# Patient Record
Sex: MALE | Race: BLACK OR AFRICAN AMERICAN | NOT HISPANIC OR LATINO | Employment: FULL TIME | URBAN - METROPOLITAN AREA
[De-identification: names, ages, dates, MRNs, and addresses within clinical notes are randomized per-mention and may not be internally consistent; named-entity substitution may affect disease eponyms.]

---

## 2023-01-04 ENCOUNTER — OFFICE VISIT (OUTPATIENT)
Dept: FAMILY MEDICINE CLINIC | Facility: CLINIC | Age: 43
End: 2023-01-04
Payer: COMMERCIAL

## 2023-01-04 VITALS
OXYGEN SATURATION: 98 % | BODY MASS INDEX: 27.85 KG/M2 | RESPIRATION RATE: 18 BRPM | SYSTOLIC BLOOD PRESSURE: 158 MMHG | DIASTOLIC BLOOD PRESSURE: 94 MMHG | WEIGHT: 224 LBS | TEMPERATURE: 97.6 F | HEART RATE: 68 BPM | HEIGHT: 75 IN

## 2023-01-04 DIAGNOSIS — Z71.1 CONCERN ABOUT STD IN MALE WITHOUT DIAGNOSIS: ICD-10-CM

## 2023-01-04 DIAGNOSIS — Z13.1 DIABETES MELLITUS SCREENING: ICD-10-CM

## 2023-01-04 DIAGNOSIS — Z13.220 LIPID SCREENING: ICD-10-CM

## 2023-01-04 DIAGNOSIS — Z00.00 ANNUAL PHYSICAL EXAM: Primary | ICD-10-CM

## 2023-01-04 DIAGNOSIS — I10 PRIMARY HYPERTENSION: ICD-10-CM

## 2023-01-04 PROCEDURE — 99386 PREV VISIT NEW AGE 40-64: CPT

## 2023-01-04 PROCEDURE — 1159F MED LIST DOCD IN RCRD: CPT

## 2023-01-04 PROCEDURE — 99213 OFFICE O/P EST LOW 20 MIN: CPT

## 2023-01-04 PROCEDURE — 1160F RVW MEDS BY RX/DR IN RCRD: CPT

## 2023-01-04 RX ORDER — AMLODIPINE BESYLATE 5 MG/1
5 TABLET ORAL DAILY
Qty: 30 TABLET | Refills: 0 | Status: SHIPPED | OUTPATIENT
Start: 2023-01-04

## 2023-01-04 NOTE — PROGRESS NOTES
Subjective   Geraldo López is a 42 y.o. male. Presents today as a New patient here to establish care and c.o HTN  Chief Complaint   Patient presents with   • Establish Care   • Hypertension     Been off meds x1 yr no listing at [facility]      History of Present Illness  Previous PCP: none  Significant medical hx: htn  Significant family hx: HTN     Smoking 2 cigars/day , alcohol 1 drink/week, drug use- smokes marihuana daily.     HTN: has been off medication x1 yr. Does not check BP at home. No headache, vision changes, dizziness. Does not remember name of medication he was on.     STD concern- sexually active with same partner. Does not use protection. No current symptoms- states would like screening test    Diet: a lot of junk food. Stays active, no routine exercise    Review of Systems   Constitutional: Negative for fever and unexpected weight change.   Eyes: Negative for visual disturbance.   Respiratory: Negative.    Cardiovascular: Negative.    Genitourinary: Negative for difficulty urinating.   Neurological: Negative for dizziness and headaches.   Psychiatric/Behavioral: Negative for sleep disturbance.       There is no problem list on file for this patient.    Past Medical History:   Diagnosis Date   • Arthritis    • Hypertension      Past Surgical History:   Procedure Laterality Date   • HERNIA REPAIR       Family History   Problem Relation Age of Onset   • Hypertension Mother    • Hypertension Father    • Cardiomyopathy Brother      Social History     Socioeconomic History   • Marital status: Single   Tobacco Use   • Smoking status: Every Day     Types: Cigars   • Smokeless tobacco: Never   Vaping Use   • Vaping Use: Never used   Substance and Sexual Activity   • Alcohol use: Yes     Comment: RARE   • Drug use: Never   • Sexual activity: Yes     Partners: Female       No Known Allergies    No current outpatient medications on file prior to visit.     No current facility-administered medications on file  prior to visit.       Objective   Vitals:    01/04/23 1519   BP: 158/94   Pulse: 68   Resp: 18   Temp: 97.6 °F (36.4 °C)   TempSrc: Temporal   SpO2: 98%   Weight: 102 kg (224 lb)   Height: 190.5 cm (75\")   PainSc: 0-No pain     Body mass index is 28 kg/m².  Physical Exam  Constitutional:       Appearance: Normal appearance. He is not ill-appearing.   Cardiovascular:      Rate and Rhythm: Normal rate and regular rhythm.      Pulses: Normal pulses.      Heart sounds: Normal heart sounds, S1 normal and S2 normal. No murmur heard.  Pulmonary:      Effort: Pulmonary effort is normal. No respiratory distress.      Breath sounds: Normal breath sounds.   Musculoskeletal:      Right lower leg: No edema.      Left lower leg: No edema.   Neurological:      General: No focal deficit present.      Mental Status: He is alert and oriented to person, place, and time.      Cranial Nerves: No dysarthria.      Gait: Gait is intact.   Psychiatric:         Attention and Perception: Attention normal.         Mood and Affect: Mood normal.         Speech: Speech normal.         Behavior: Behavior normal.         Thought Content: Thought content normal.         Cognition and Memory: Cognition normal.         Judgment: Judgment normal.         Assessment & Plan   Diagnoses and all orders for this visit:    1. Annual physical exam (Primary)  -     CBC & Differential  -     Comprehensive Metabolic Panel  -     Hemoglobin A1c  -     Lipid Panel  -     TSH  -     Hepatitis C Antibody  -     Discussed safe sex practices Use condoms 100% of time with sex to protect against STIs- birth control does not provide protection. Eat a healthy diet and exercise routinely. Avoid smoking/alcohol and drugs. Use seatbelt 100% of time.       2. Primary hypertension  -     amLODIPine (NORVASC) 5 MG tablet; Take 1 tablet by mouth Daily.  Dispense: 30 tablet; Refill: 0  -     Elevated BP today and has prior dx. Will go ahead and start on amlodipine. Use and common  SE discussed.  Advised to get monitor and start checking at home- daily for now. Discussed risks of untreated HTN including but not limited to kidney disease, eye disease, MI, Stroke, etc.   -   Work on a healthy diet.  Limit salt, sugar and fatty foods.  Add exercise to your routine.  Recommendation is 150 minutes of moderate exercise per week if you cannot do that, at least walk 30 minutes 3-4 times per week.   Limit or avoid smoking and alcohol    3. Lipid screening  -     Lipid Panel    4. Diabetes mellitus screening  -     Hemoglobin A1c    5. Concern about STD in male without diagnosis  -     Hepatitis C Antibody  -     Chlamydia trachomatis, Neisseria gonorrhoeae, PCR - Urine, Urinary Bladder  -     RPR  -     HIV-1 / O / 2 Ag / Antibody 4th Generation         -Follow up: 2 weeks for BP check, sooner as needed. Will advise further once lab results available       - Pt agrees with plan of care and states no further concerns or questions today    This document is intended for medical expert use only. Reading of this document by patients and/or patient's family without participating medical staff guidance may result in misinterpretation and unintended morbidity.  Any interpretation of such data is the responsibility of the patient and/or family member responsible for the patient in concert with their primary or specialist providers, not to be left for sources of online searches such as [employer], [employer] or similar queries. Relying on these approaches to knowledge may result in misinterpretation, misguided goals of care and even death should patients or family members try recommendations outside of the realm of professional medical care in a supervised way.     Please allow 3-5 business days for recommendations based on new results     Go to the ER for any possible lifethreatening symptoms such as chest pain or shortness of air.      I personally spent 20 minutes with this patient, preparing for the visit, reviewing  tests, obtaining and/or reviewing a separately obtained history, performing a medically appropriate examination and/or evaluation , counseling and educating the patient/family/caregiver, ordering medications, tests, or procedures, documenting information in the medical record and independently interpreting results.

## 2023-01-06 DIAGNOSIS — R76.8 HEPATITIS C ANTIBODY TEST POSITIVE: Primary | ICD-10-CM

## 2023-01-06 LAB
ALBUMIN SERPL-MCNC: 4.9 G/DL (ref 4–5)
ALBUMIN/GLOB SERPL: 1.8 {RATIO} (ref 1.2–2.2)
ALP SERPL-CCNC: 101 IU/L (ref 44–121)
ALT SERPL-CCNC: 44 IU/L (ref 0–44)
AST SERPL-CCNC: 33 IU/L (ref 0–40)
BASOPHILS # BLD AUTO: 0 X10E3/UL (ref 0–0.2)
BASOPHILS NFR BLD AUTO: 0 %
BILIRUB SERPL-MCNC: 0.7 MG/DL (ref 0–1.2)
BUN SERPL-MCNC: 12 MG/DL (ref 6–24)
BUN/CREAT SERPL: 12 (ref 9–20)
C TRACH RRNA SPEC QL NAA+PROBE: NEGATIVE
CALCIUM SERPL-MCNC: 9.8 MG/DL (ref 8.7–10.2)
CHLORIDE SERPL-SCNC: 103 MMOL/L (ref 96–106)
CHOLEST SERPL-MCNC: 187 MG/DL (ref 100–199)
CO2 SERPL-SCNC: 24 MMOL/L (ref 20–29)
CREAT SERPL-MCNC: 0.98 MG/DL (ref 0.76–1.27)
EGFRCR SERPLBLD CKD-EPI 2021: 99 ML/MIN/1.73
EOSINOPHIL # BLD AUTO: 0.2 X10E3/UL (ref 0–0.4)
EOSINOPHIL NFR BLD AUTO: 3 %
ERYTHROCYTE [DISTWIDTH] IN BLOOD BY AUTOMATED COUNT: 13.5 % (ref 11.6–15.4)
GLOBULIN SER CALC-MCNC: 2.7 G/DL (ref 1.5–4.5)
GLUCOSE SERPL-MCNC: 89 MG/DL (ref 70–99)
HBA1C MFR BLD: 6 % (ref 4.8–5.6)
HCT VFR BLD AUTO: 44.1 % (ref 37.5–51)
HCV AB S/CO SERPL IA: 2.5 S/CO RATIO (ref 0–0.9)
HDLC SERPL-MCNC: 49 MG/DL
HGB BLD-MCNC: 15.1 G/DL (ref 13–17.7)
HIV 1+2 AB+HIV1 P24 AG SERPL QL IA: NON REACTIVE
IMM GRANULOCYTES # BLD AUTO: 0.1 X10E3/UL (ref 0–0.1)
IMM GRANULOCYTES NFR BLD AUTO: 1 %
LDLC SERPL CALC-MCNC: 119 MG/DL (ref 0–99)
LYMPHOCYTES # BLD AUTO: 4 X10E3/UL (ref 0.7–3.1)
LYMPHOCYTES NFR BLD AUTO: 42 %
MCH RBC QN AUTO: 30.3 PG (ref 26.6–33)
MCHC RBC AUTO-ENTMCNC: 34.2 G/DL (ref 31.5–35.7)
MCV RBC AUTO: 88 FL (ref 79–97)
MONOCYTES # BLD AUTO: 0.7 X10E3/UL (ref 0.1–0.9)
MONOCYTES NFR BLD AUTO: 7 %
N GONORRHOEA RRNA SPEC QL NAA+PROBE: NEGATIVE
NEUTROPHILS # BLD AUTO: 4.4 X10E3/UL (ref 1.4–7)
NEUTROPHILS NFR BLD AUTO: 47 %
PLATELET # BLD AUTO: 286 X10E3/UL (ref 150–450)
POTASSIUM SERPL-SCNC: 4.5 MMOL/L (ref 3.5–5.2)
PROT SERPL-MCNC: 7.6 G/DL (ref 6–8.5)
RBC # BLD AUTO: 4.99 X10E6/UL (ref 4.14–5.8)
RPR SER QL: NON REACTIVE
SODIUM SERPL-SCNC: 142 MMOL/L (ref 134–144)
TRIGL SERPL-MCNC: 105 MG/DL (ref 0–149)
TSH SERPL DL<=0.005 MIU/L-ACNC: 3.4 UIU/ML (ref 0.45–4.5)
VLDLC SERPL CALC-MCNC: 19 MG/DL (ref 5–40)
WBC # BLD AUTO: 9.5 X10E3/UL (ref 3.4–10.8)

## 2023-01-06 NOTE — PROGRESS NOTES
Please CALL pt with abnormal results    He is positive for hepatitis c - has he been told this in the past?   I have added some more labs to come and get done to tell us if new or old infection. I have also send a referral to gastro to see for this. Make sure he knows to come to lab for these tests    Other STD tests negative    Blood levels within normal limits  Kidney, liver and thyroid function stable  Cholesterol a little elevated- work on healthy diet and exercise as discussed.     Make sure to follow up in 2 weeks to check BP    Repeat labs in 6 months. Call if questions.

## 2023-02-22 ENCOUNTER — OFFICE VISIT (OUTPATIENT)
Dept: GASTROENTEROLOGY | Facility: CLINIC | Age: 43
End: 2023-02-22
Payer: COMMERCIAL

## 2023-02-22 VITALS
BODY MASS INDEX: 27.39 KG/M2 | HEART RATE: 87 BPM | DIASTOLIC BLOOD PRESSURE: 114 MMHG | WEIGHT: 220.3 LBS | HEIGHT: 75 IN | TEMPERATURE: 96.7 F | SYSTOLIC BLOOD PRESSURE: 198 MMHG

## 2023-02-22 DIAGNOSIS — I10 PRIMARY HYPERTENSION: Chronic | ICD-10-CM

## 2023-02-22 DIAGNOSIS — R76.8 HEPATITIS C ANTIBODY POSITIVE IN BLOOD: Primary | ICD-10-CM

## 2023-02-22 DIAGNOSIS — R74.8 ELEVATED LIVER ENZYMES: ICD-10-CM

## 2023-02-22 PROCEDURE — 99203 OFFICE O/P NEW LOW 30 MIN: CPT | Performed by: INTERNAL MEDICINE

## 2023-02-22 NOTE — PROGRESS NOTES
Chief Complaint   Patient presents with   • Hepatitis C       Subjective     HPI    Geraldo López is a 42 y.o. male with a past medical history noted below who presents for evaluation of positive hepatitis C antibody.  This was found on routine labs with his PCP and was the first time he had heard of this.  He denies any risk factors including no IV drug use, no intranasal cocaine use, no blood transfusions.  He does have some tattoos that were not done at a tattoo shop.  Recent transaminases show a mild elevation.    No family hx of GI malignancies    Hx hernia repair as a child    BP elevated 198/114 today.  Manual recheck was down to 185/110.  He is asymptomatic from this.  He admits that he has been busy at work and forgot his blood pressure medication this morning.        Smokes cigars, about 1 per day.  No excess ETOH.      Today's visit was in the office.  Both the patient and I were wearing face masks and proper hand hygiene was performed before and after the physical exam.           Current Outpatient Medications:   •  amLODIPine (NORVASC) 5 MG tablet, Take 1 tablet by mouth Daily., Disp: 30 tablet, Rfl: 0      Objective     Vitals:    02/22/23 1542   BP: (!) 198/114   Pulse: 87   Temp: 96.7 °F (35.9 °C)         02/22/23  1542   Weight: 99.9 kg (220 lb 4.8 oz)     Body mass index is 27.54 kg/m².    Physical Exam  Constitutional:       General: He is not in acute distress.  Pulmonary:      Effort: Pulmonary effort is normal.   Neurological:      Mental Status: He is alert and oriented to person, place, and time.   Psychiatric:         Mood and Affect: Mood normal.         Behavior: Behavior normal.         Thought Content: Thought content normal.         Judgment: Judgment normal.             WBC   Date Value Ref Range Status   01/04/2023 9.5 3.4 - 10.8 x10E3/uL Final     RBC   Date Value Ref Range Status   01/04/2023 4.99 4.14 - 5.80 x10E6/uL Final     Hemoglobin   Date Value Ref Range  Status   01/04/2023 15.1 13.0 - 17.7 g/dL Final     Hematocrit   Date Value Ref Range Status   01/04/2023 44.1 37.5 - 51.0 % Final     MCV   Date Value Ref Range Status   01/04/2023 88 79 - 97 fL Final     MCH   Date Value Ref Range Status   01/04/2023 30.3 26.6 - 33.0 pg Final     MCHC   Date Value Ref Range Status   01/04/2023 34.2 31.5 - 35.7 g/dL Final     RDW   Date Value Ref Range Status   01/04/2023 13.5 11.6 - 15.4 % Final     Platelets   Date Value Ref Range Status   01/04/2023 286 150 - 450 x10E3/uL Final     Neutrophil Rel %   Date Value Ref Range Status   01/04/2023 47 Not Estab. % Final     Lymphocyte Rel %   Date Value Ref Range Status   01/04/2023 42 Not Estab. % Final     Monocyte Rel %   Date Value Ref Range Status   01/04/2023 7 Not Estab. % Final     Eosinophil Rel %   Date Value Ref Range Status   01/04/2023 3 Not Estab. % Final     Basophil Rel %   Date Value Ref Range Status   01/04/2023 0 Not Estab. % Final     Neutrophils Absolute   Date Value Ref Range Status   01/04/2023 4.4 1.4 - 7.0 x10E3/uL Final     Lymphocytes Absolute   Date Value Ref Range Status   01/04/2023 4.0 (H) 0.7 - 3.1 x10E3/uL Final     Monocytes Absolute   Date Value Ref Range Status   01/04/2023 0.7 0.1 - 0.9 x10E3/uL Final     Eosinophils Absolute   Date Value Ref Range Status   01/04/2023 0.2 0.0 - 0.4 x10E3/uL Final     Basophils Absolute   Date Value Ref Range Status   01/04/2023 0.0 0.0 - 0.2 x10E3/uL Final       Lab Results   Component Value Date    GLUCOSE 89 01/04/2023    BUN 12 01/04/2023    CREATININE 0.98 01/04/2023    BCR 12 01/04/2023    CO2 24 01/04/2023    CALCIUM 9.8 01/04/2023    PROTENTOTREF 7.6 01/04/2023    ALBUMIN 4.9 01/04/2023    LABIL2 1.8 01/04/2023    AST 33 01/04/2023    ALT 44 01/04/2023         Imaging Results (Last 7 Days)     ** No results found for the last 168 hours. **          I personally reviewed data as detailed below:     The labs listed above.      Office notes from: 1/4/23  pcp        Assessment and Plan  1.  Positive hepatitis C antibody: Possibly active infection versus prior exposure and clearance    2.  Elevated liver enzymes mild: Possibly related to above versus other    3.  Primary hypertension: He was has been recently started on medications but has forgot it this morning.  Recheck down to 185/110; still elevated    Plan  Hepatitis C viral load, genotype and repeat antibody    Hepatitis B surface antigen and antibody    Repeat CMP    We discussed his blood pressure.  I explained that the safest thing would be for him to go to the emergency room for further evaluation and treatment.  He feels that given stressors and missing his blood pressure medication have caused this andhe does not want to do this.  He is going to go home and take his blood pressure medication.  He says that he has a cuff and he will recheck his blood pressure there.  He does understand the risk of not going to the emergency room, including but not limited to stroke and other cardiovascular events.  I have advised him to go to the ER if his blood pressure has not improved or if he begins to have symptoms.  I am sending a message to his primary care as well.        Diagnoses and all orders for this visit:    1. Hepatitis C antibody positive in blood (Primary)  -     Hepatitis C RNA, Quantitative, PCR (graph)  -     Hepatitis C Genotype  -     Hepatitis C Antibody  -     Hepatitis B Surface Antigen  -     Hepatitis B Core Antibody, Total  -     Comprehensive Metabolic Panel    2. Elevated liver enzymes  -     Hepatitis C RNA, Quantitative, PCR (graph)  -     Hepatitis C Genotype  -     Hepatitis C Antibody  -     Hepatitis B Surface Antigen  -     Hepatitis B Core Antibody, Total  -     Comprehensive Metabolic Panel    3. Primary hypertension          I have discussed the above plan with the patient.  They verbalize understanding and are in agreement with the plan.  They have been advised to contact the office  for any questions, concerns, or changes related to their health.    Dictated utilizing Dragon dictation

## 2023-02-23 ENCOUNTER — TELEPHONE (OUTPATIENT)
Dept: FAMILY MEDICINE CLINIC | Facility: CLINIC | Age: 43
End: 2023-02-23
Payer: COMMERCIAL

## 2023-02-23 LAB
ALBUMIN SERPL-MCNC: 4.7 G/DL (ref 4–5)
ALBUMIN/GLOB SERPL: 1.9 {RATIO} (ref 1.2–2.2)
ALP SERPL-CCNC: 89 IU/L (ref 44–121)
ALT SERPL-CCNC: 32 IU/L (ref 0–44)
AST SERPL-CCNC: 28 IU/L (ref 0–40)
BILIRUB SERPL-MCNC: 0.7 MG/DL (ref 0–1.2)
BUN SERPL-MCNC: 15 MG/DL (ref 6–24)
BUN/CREAT SERPL: 14 (ref 9–20)
CALCIUM SERPL-MCNC: 9.6 MG/DL (ref 8.7–10.2)
CHLORIDE SERPL-SCNC: 102 MMOL/L (ref 96–106)
CO2 SERPL-SCNC: 21 MMOL/L (ref 20–29)
CREAT SERPL-MCNC: 1.08 MG/DL (ref 0.76–1.27)
EGFRCR SERPLBLD CKD-EPI 2021: 88 ML/MIN/1.73
GLOBULIN SER CALC-MCNC: 2.5 G/DL (ref 1.5–4.5)
GLUCOSE SERPL-MCNC: 111 MG/DL (ref 70–99)
HBV CORE AB SERPL QL IA: NEGATIVE
HBV SURFACE AG SERPL QL IA: NEGATIVE
HCV IGG SERPL QL IA: REACTIVE
POTASSIUM SERPL-SCNC: 3.5 MMOL/L (ref 3.5–5.2)
PROT SERPL-MCNC: 7.2 G/DL (ref 6–8.5)
SODIUM SERPL-SCNC: 141 MMOL/L (ref 134–144)

## 2023-02-23 NOTE — TELEPHONE ENCOUNTER
Ok for hub to read!!!!      lmtcb for nurse visit for bp check and if still taking bp meds Amlodipine 5mg daily ?

## 2023-02-24 LAB
HCV RNA SERPL NAA+PROBE-ACNC: NORMAL IU/ML
TEST INFORMATION: NORMAL

## 2023-02-24 NOTE — PROGRESS NOTES
His hepatitis C antibody remains positive but he has no hepatitis C viral load.  This is consistent with exposure and clearance of the infection.    Transaminases are improved from prior check.    Hepatitis B testing is negative    Focus on blood pressure control, healthy lifestyle changes with diet and exercise.

## 2023-02-26 LAB
HCV GENTYP SERPL NAA+PROBE: NORMAL
LABORATORY COMMENT REPORT: NORMAL

## 2023-02-27 ENCOUNTER — TELEPHONE (OUTPATIENT)
Dept: GASTROENTEROLOGY | Facility: CLINIC | Age: 43
End: 2023-02-27
Payer: COMMERCIAL

## 2023-03-01 ENCOUNTER — TELEPHONE (OUTPATIENT)
Dept: GASTROENTEROLOGY | Facility: CLINIC | Age: 43
End: 2023-03-01
Payer: COMMERCIAL

## 2023-03-01 NOTE — TELEPHONE ENCOUNTER
Caller: Geraldo López    Relationship to patient: Self    Best call back number: [phone] (Mobile)    Patient is needing: PT IS CALLING BACK REGARDING VMS LEFT FOR HIM THIS WEEK 2/27/23 AND 2/28/23. TRIED TO TRANSFER PT, AND NO ONE IS AVAIL. PT NEEDS A CALL BACK ASAP.